# Patient Record
Sex: MALE | HISPANIC OR LATINO | Employment: FULL TIME | URBAN - METROPOLITAN AREA
[De-identification: names, ages, dates, MRNs, and addresses within clinical notes are randomized per-mention and may not be internally consistent; named-entity substitution may affect disease eponyms.]

---

## 2021-03-22 ENCOUNTER — HOSPITAL ENCOUNTER (EMERGENCY)
Facility: MEDICAL CENTER | Age: 36
End: 2021-03-22
Attending: EMERGENCY MEDICINE | Admitting: EMERGENCY MEDICINE
Payer: COMMERCIAL

## 2021-03-22 ENCOUNTER — HOSPITAL ENCOUNTER (OUTPATIENT)
Dept: RADIOLOGY | Facility: MEDICAL CENTER | Age: 36
End: 2021-03-22
Payer: COMMERCIAL

## 2021-03-22 VITALS
HEIGHT: 67 IN | TEMPERATURE: 98.2 F | SYSTOLIC BLOOD PRESSURE: 135 MMHG | RESPIRATION RATE: 18 BRPM | DIASTOLIC BLOOD PRESSURE: 82 MMHG | OXYGEN SATURATION: 97 % | BODY MASS INDEX: 35.92 KG/M2 | HEART RATE: 92 BPM | WEIGHT: 228.84 LBS

## 2021-03-22 DIAGNOSIS — M51.36 BULGING LUMBAR DISC: ICD-10-CM

## 2021-03-22 DIAGNOSIS — M54.42 LEFT-SIDED LOW BACK PAIN WITH LEFT-SIDED SCIATICA, UNSPECIFIED CHRONICITY: ICD-10-CM

## 2021-03-22 PROCEDURE — 700102 HCHG RX REV CODE 250 W/ 637 OVERRIDE(OP): Performed by: EMERGENCY MEDICINE

## 2021-03-22 PROCEDURE — 99284 EMERGENCY DEPT VISIT MOD MDM: CPT

## 2021-03-22 PROCEDURE — 99283 EMERGENCY DEPT VISIT LOW MDM: CPT

## 2021-03-22 PROCEDURE — A9270 NON-COVERED ITEM OR SERVICE: HCPCS | Performed by: EMERGENCY MEDICINE

## 2021-03-22 RX ORDER — OXYCODONE HYDROCHLORIDE 5 MG/1
5 TABLET ORAL 2 TIMES DAILY
COMMUNITY

## 2021-03-22 RX ORDER — ACETAMINOPHEN 325 MG/1
TABLET ORAL EVERY 6 HOURS PRN
COMMUNITY

## 2021-03-22 RX ORDER — DIAZEPAM 5 MG/1
5 TABLET ORAL EVERY 6 HOURS PRN
Qty: 12 TABLET | Refills: 0 | Status: SHIPPED | OUTPATIENT
Start: 2021-03-22 | End: 2021-03-25

## 2021-03-22 RX ORDER — OXYCODONE HYDROCHLORIDE AND ACETAMINOPHEN 5; 325 MG/1; MG/1
2 TABLET ORAL ONCE
Status: COMPLETED | OUTPATIENT
Start: 2021-03-22 | End: 2021-03-22

## 2021-03-22 RX ORDER — HYDROCODONE BITARTRATE AND ACETAMINOPHEN 5; 325 MG/1; MG/1
1 TABLET ORAL EVERY 4 HOURS PRN
Qty: 12 TABLET | Refills: 0 | Status: SHIPPED | OUTPATIENT
Start: 2021-03-22 | End: 2021-03-25

## 2021-03-22 RX ORDER — NAPROXEN 250 MG/1
250 TABLET ORAL 2 TIMES DAILY WITH MEALS
COMMUNITY

## 2021-03-22 RX ADMIN — OXYCODONE HYDROCHLORIDE AND ACETAMINOPHEN 2 TABLET: 5; 325 TABLET ORAL at 23:14

## 2021-03-22 NOTE — Clinical Note
Gonzalo Ochoa was seen and treated in our emergency department on 3/22/2021.  He may return to work on 03/29/2021.       If you have any questions or concerns, please don't hesitate to call.      Kal Soto M.D.

## 2021-03-23 NOTE — ED PROVIDER NOTES
ED Provider Note    CHIEF COMPLAINT  Chief Complaint   Patient presents with   • Back Pain   • Sent by MD MULLINS  Gonzalo Ochoa is a 35 y.o. male who presents in transfer from [facility] for left low back pain radiating to the left lower extremity.  Patient states the pain has been present off and on for 7 years however last Monday he exacerbated the pain by doing heavy lifting.  Since then he has had pain despite Motrin and muscle relaxers.  Patient was seen at [facility] today and apparently had an MRI performed of his lumbar spine.  This was concerning for possible spinal canal stenosis and thus he was sent here for evaluation.  Patient was first discussed with the neurosurgeon on-call who okayed the transfer.  Patient refused ambulance transfer and drove here himself.  He endorses pain to the left low back which is severe and radiating to the left groin, testicle, and lower extremity.  He denies any numbness or tingling of the perineal region and has had no difficulty starting urine stream or bowel incontinence.  He has had no fevers or chills and is not an IV drug user.    REVIEW OF SYSTEMS  Constitutional: No fevers or chills  Skin: No rashes  HEENT: No sore throat, or runny nose.  Neck: No neck pain  Chest: No pain   Pulm: No shortness of breath, or cough  Gastrointestinal: No nausea, vomiting, diarrhea, constipation, bloating, melena, hematochezia or abdominal pain.  Genitourinary: No dysuria or hematuria  Musculoskeletal: No recent trauma, swelling, or bruising.  Perceived weakness of the left lower extremity due to pain in low back  Neurologic: No occasional decreased sensation left anterior thigh. No confusion or disorientation.  Heme: No bleeding or bruising problems.   Immuno: No hx of recurrent infections      PAST MEDICAL HISTORY   Low back pain    SOCIAL HISTORY  Social History     Tobacco Use   • Smoking status: Never Smoker   • Smokeless tobacco: Never Used   Substance and  "Sexual Activity   • Alcohol use: Never   • Drug use: Never   • Sexual activity: Not on file       SURGICAL HISTORY  patient denies any surgical history    CURRENT MEDICATIONS  Home Medications     Reviewed by Geovanna Osullivan R.N. (Registered Nurse) on 03/22/21 at 1936  Med List Status: Complete   Medication Last Dose Status   acetaminophen (TYLENOL) 325 MG Tab  Active   naproxen (NAPROSYN) 250 MG Tab  Active   oxyCODONE immediate-release (ROXICODONE) 5 MG Tab  Active                ALLERGIES  No Known Allergies    PHYSICAL EXAM  VITAL SIGNS: /82   Pulse 92   Temp 36.8 °C (98.2 °F) (Oral)   Resp 18   Ht 1.702 m (5' 7\")   Wt 104 kg (228 lb 13.4 oz)   SpO2 97%   BMI 35.84 kg/m²    Gen: Alert in no apparent distress.  HEENT: No signs of trauma, Bilateral external ears normal, Nose normal. Conjunctiva normal, Non-icteric.   Neck:  No tenderness, Supple, No masses  Lymphatic: No cervical lymphadenopathy noted.   Cardiovascular: Regular rate and rhythm, no murmurs.  Capillary refill less than 3 seconds to all extremities, 2+ distal pulses.  Thorax & Lungs: Normal breath sounds, No respiratory distress, No wheezing bilateral chest rise  Abdomen: Bowel sounds normal, Soft, No tenderness, No masses, No pulsatile masses. No Guarding or rebound  Skin: Warm, Dry, No erythema, No rash noted to exposed areas.   Back: No bony tenderness, No CVA tenderness.  Mild tenderness to palpation in left lumbar musculature region.  No induration, erythema, or ecchymosis noted.  Extremities: Intact distal pulses, No edema  Neurologic: Alert , no facial droop, patient able to lift both legs independently off the bed and hold them although this does increase his pain.  5 out of 5 strength in ankle plantar and dorsiflexion.  Patient able to stand on his own although this does take some effort and appears painful.  Able to stand up on tiptoes.  Able to ambulate with his cane.   Psychiatric: Affect pleasant    Reviewed MRI report " as well as images.    COURSE & MEDICAL DECISION MAKING  Patient was sent from outside facility of concern for possible spinal cord compression although he did not have any stigmata of cauda equina on my exam.  He certainly appeared very painful when transitioning from lying, to sitting, to standing but was able to do so under his own power.  He was able to stand on his tiptoes and did not have any bowel or bladder dysfunction.  He denied any saddle anesthesia but did state he had pain and sometimes sensory changes to the left lower extremity.  He also noted pain to his groin area.  He noted that he had been having this pain for some time although it is been episodic.  I did discuss the case with the on-call neurosurgeon who also spoke with the outside facility.  There is a disc protrusion but this did not require any emergent surgical intervention based on my exam and the MRI images.  It was suggested that the patient follow-up as an outpatient for further evaluation.  In the meantime he will be treated symptomatically.  He will return if symptoms worsen or change in any way and he stated clear understanding of return precautions.    FINAL IMPRESSION  1. Left-sided low back pain with left-sided sciatica, unspecified chronicity    2. Bulging lumbar disc        Electronically signed by: Kal Soto M.D., 3/22/2021 9:26 PM

## 2021-03-23 NOTE — ED TRIAGE NOTES
"Chief Complaint   Patient presents with   • Back Pain   • Sent by MD       Patient to triage for above complaint. Patient was seen at [facility] in [place]. Patient has had this back pain for 7 years and states he has not received treatment for it. On Monday patient lifted heavy furniture, and on Tuesday pt was having difficulty ambulating. Pt uses a cane to ambulate. States the pain is 9/10 lower back like a stabbing sensation. Patient was going to be transferred to this facility, but refused ambulance transportation. Per Patient the other facility completed a MRI of his back and it showed sever stenosis on L4 and L5. The facility is requesting a neurosurgeon consult.  Per patient no other medical history. Takes naproxen, oxycodone and tylenol at home, to manage pain.     Pt is alert and oriented, speaking in full sentences, follows commands and responds appropriately to questions. NAD. Resp are even and unlabored.      Pt placed in lobby. Pt educated on triage process. Pt encouraged to alert staff for any changes.     Patient and staff wearing appropriate PPE    /84   Pulse 91   Temp 36.8 °C (98.2 °F) (Oral)   Resp 18   Ht 1.702 m (5' 7\")   Wt 104 kg (228 lb 13.4 oz)   SpO2 98%   BMI 35.84 kg/m²   "

## 2021-03-23 NOTE — ED NOTES
Pt given discharge instructions and follow up information with neuro and information on prescriptions sent to pharmacy. RN assisted patient to lobby in wheelchair to wait for his ride.